# Patient Record
Sex: FEMALE | Race: BLACK OR AFRICAN AMERICAN | NOT HISPANIC OR LATINO | Employment: FULL TIME | URBAN - METROPOLITAN AREA
[De-identification: names, ages, dates, MRNs, and addresses within clinical notes are randomized per-mention and may not be internally consistent; named-entity substitution may affect disease eponyms.]

---

## 2017-03-14 ENCOUNTER — CLINICAL SUPPORT (OUTPATIENT)
Dept: OBSTETRICS AND GYNECOLOGY | Facility: CLINIC | Age: 47
End: 2017-03-14
Payer: COMMERCIAL

## 2017-03-14 DIAGNOSIS — N85.01 SIMPLE ENDOMETRIAL HYPERPLASIA: Primary | ICD-10-CM

## 2017-03-14 PROCEDURE — 96372 THER/PROPH/DIAG INJ SC/IM: CPT | Mod: S$GLB,,, | Performed by: OBSTETRICS & GYNECOLOGY

## 2017-03-14 PROCEDURE — 99999 PR PBB SHADOW E&M-EST. PATIENT-LVL I: CPT | Mod: PBBFAC,,,

## 2017-03-14 RX ADMIN — MEDROXYPROGESTERONE ACETATE 150 MG: 150 INJECTION, SUSPENSION INTRAMUSCULAR at 10:03

## 2017-03-14 NOTE — PROGRESS NOTES
Here for Depo Provera Injection, date due  3/2 - 3/16/17   . Last injection given  12/15/16  . Patient with no current complaints of pain prior to or after injection. Advised to wait 5 minutes. Return between May 30 - June13 /2017 for next injection.  PATIENT SUPPLIED MEDICATION.  See medication Card for RX information  Order verified, inspected package,storage verified,expiration verified      Site - LB

## 2017-06-08 ENCOUNTER — TELEPHONE (OUTPATIENT)
Dept: OBSTETRICS AND GYNECOLOGY | Facility: CLINIC | Age: 47
End: 2017-06-08

## 2017-06-08 DIAGNOSIS — Z12.31 VISIT FOR SCREENING MAMMOGRAM: Primary | ICD-10-CM

## 2017-06-08 NOTE — TELEPHONE ENCOUNTER
----- Message from Acosta Stiles sent at 6/8/2017  3:29 PM CDT -----  Contact: Pt  X_ 1st Request  _ 2nd Request  _ 3rd Request    Who:GAVIN HALEY [[id number]]    Why: Patient would like to have her Mammogram orders put in    What Number to Call Back: [phone]    When to Expect a call back: (Before the end of the day)  -- if call after 3:00 call back will be tomorrow.

## 2017-06-08 NOTE — TELEPHONE ENCOUNTER
----- Message from Acosta Stiles sent at 6/8/2017  3:29 PM CDT -----  Contact: Pt  X_ 1st Request  _ 2nd Request  _ 3rd Request    Who:GAVIN HALEY [[id number]]    Why: Patient would like to have her Mammogram orders put in    What Number to Call Back: [phone]    When to Expect a call back: (Before the end of the day)  -- if call after 3:00 call back will be tomorrow.

## 2017-06-15 ENCOUNTER — CLINICAL SUPPORT (OUTPATIENT)
Dept: OBSTETRICS AND GYNECOLOGY | Facility: CLINIC | Age: 47
End: 2017-06-15
Payer: COMMERCIAL

## 2017-06-15 DIAGNOSIS — N80.9 ENDOMETRIOSIS: Primary | ICD-10-CM

## 2017-06-15 PROCEDURE — 96372 THER/PROPH/DIAG INJ SC/IM: CPT | Mod: S$GLB,,, | Performed by: OBSTETRICS & GYNECOLOGY

## 2017-06-15 RX ADMIN — MEDROXYPROGESTERONE ACETATE 150 MG: 150 INJECTION, SUSPENSION INTRAMUSCULAR at 02:06

## 2017-06-15 NOTE — PROGRESS NOTES
Depo injection given in rt arm, patient supplied medication, patient had no complaints before and after injection. Patient was given date to return.

## 2017-06-28 ENCOUNTER — OFFICE VISIT (OUTPATIENT)
Dept: OBSTETRICS AND GYNECOLOGY | Facility: CLINIC | Age: 47
End: 2017-06-28
Payer: COMMERCIAL

## 2017-06-28 ENCOUNTER — HOSPITAL ENCOUNTER (OUTPATIENT)
Dept: RADIOLOGY | Facility: OTHER | Age: 47
Discharge: HOME | End: 2017-06-28
Attending: OBSTETRICS & GYNECOLOGY
Payer: COMMERCIAL

## 2017-06-28 VITALS
DIASTOLIC BLOOD PRESSURE: 88 MMHG | WEIGHT: 240.31 LBS | HEIGHT: 62 IN | SYSTOLIC BLOOD PRESSURE: 140 MMHG | BODY MASS INDEX: 44.22 KG/M2

## 2017-06-28 DIAGNOSIS — Z01.419 ENCOUNTER FOR GYNECOLOGICAL EXAMINATION WITHOUT ABNORMAL FINDING: Primary | ICD-10-CM

## 2017-06-28 DIAGNOSIS — Z12.31 VISIT FOR SCREENING MAMMOGRAM: ICD-10-CM

## 2017-06-28 DIAGNOSIS — N85.01 SIMPLE ENDOMETRIAL HYPERPLASIA: ICD-10-CM

## 2017-06-28 PROCEDURE — 77063 BREAST TOMOSYNTHESIS BI: CPT | Mod: 26,,, | Performed by: INTERNAL MEDICINE

## 2017-06-28 PROCEDURE — 99396 PREV VISIT EST AGE 40-64: CPT | Mod: S$GLB,,, | Performed by: OBSTETRICS & GYNECOLOGY

## 2017-06-28 PROCEDURE — 99999 PR PBB SHADOW E&M-EST. PATIENT-LVL II: CPT | Mod: PBBFAC,,, | Performed by: OBSTETRICS & GYNECOLOGY

## 2017-06-28 PROCEDURE — 77067 SCR MAMMO BI INCL CAD: CPT | Mod: 26,,, | Performed by: INTERNAL MEDICINE

## 2017-06-28 PROCEDURE — 77067 SCR MAMMO BI INCL CAD: CPT | Mod: TC

## 2017-06-28 RX ORDER — MEDROXYPROGESTERONE ACETATE 150 MG/ML
150 INJECTION, SUSPENSION INTRAMUSCULAR
Qty: 1 SYRINGE | Refills: 3 | Status: SHIPPED | OUTPATIENT
Start: 2017-06-28 | End: 2018-07-18

## 2017-06-28 NOTE — PROGRESS NOTES
PT HERE FOR ANNUAL.  NO PROBLEMS. NEEDS DEPO FOR SIMPLE HYPERPLASIA.    ROS:  GENERAL: No fever, chills, fatigability or weight loss.  VULVAR: No pain, no lesions and no itching.  VAGINAL: No relaxation, no itching, no discharge, no abnormal bleeding and no lesions.  ABDOMEN: No abdominal pain. Denies nausea. Denies vomiting. No diarrhea. No constipation  BREAST: Denies pain. No lumps. No discharge.  URINARY: No incontinence, no nocturia, no frequency and no dysuria.  CARDIOVASCULAR: No chest pain. No shortness of breath. No leg cramps.  NEUROLOGICAL: No headaches. No vision changes.  The remainder of the review of systems was negative.    PE:  General Appearance: obese And Well developed. Well nourished. In no acute distress.  Vulva: Lesions: No.  Urethral Meatus: Normal size. Normal location. No lesions. No prolapse.  Urethra: No masses. No tenderness. No prolapse. No scarring.  Bladder: No masses. No tenderness.  Vagina: Mucosa NI:yes discharge no, atrophy no, cystocele no or rectocele no.  Cervix: Lesion: no  Stenotic: no Cervical motion tenderness: no  Uterus: Uterus size: 7 weeks. Support good. Uterus size: Normal  Adnexa: Masses: No Tenderness: No CDS Nodularity: No  Abdomen: obese No masses. No tenderness.  Breasts: No bilateral masses. No bilateral discharge. No bilateral tenderness. No bilateral fibrocystic changes.  Neck: No thyroid enlargement. No thyroid masses.  Skin: Rashes: No      PROCEDURES:    PLAN:     DIAGNOSIS:  1. Encounter for gynecological examination without abnormal finding    2. Simple endometrial hyperplasia        MEDICATIONS & ORDERS:  Orders Placed This Encounter    medroxyPROGESTERone (DEPO-PROVERA) 150 mg/mL Syrg       Patient was counseled today on the new ACS guidelines for cervical cytology screening as well as the current recommendations for breast cancer screening. She was counseled to follow up with her PCP for other routine health maintenance. Counseling session lasted  approximately 10 minutes, and all her questions were answered.         FOLLOW-UP: With me in 12 month

## 2017-09-13 ENCOUNTER — CLINICAL SUPPORT (OUTPATIENT)
Dept: OBSTETRICS AND GYNECOLOGY | Facility: CLINIC | Age: 47
End: 2017-09-13
Payer: COMMERCIAL

## 2017-09-13 DIAGNOSIS — N85.01 SIMPLE ENDOMETRIAL HYPERPLASIA: Primary | ICD-10-CM

## 2017-09-13 PROCEDURE — 96372 THER/PROPH/DIAG INJ SC/IM: CPT | Mod: S$GLB,,, | Performed by: OBSTETRICS & GYNECOLOGY

## 2017-09-13 PROCEDURE — 99999 PR PBB SHADOW E&M-EST. PATIENT-LVL I: CPT | Mod: PBBFAC,,,

## 2017-09-13 RX ADMIN — MEDROXYPROGESTERONE ACETATE 150 MG: 150 INJECTION, SUSPENSION INTRAMUSCULAR at 04:09

## 2017-09-14 RX ORDER — MEDROXYPROGESTERONE ACETATE 150 MG/ML
150 INJECTION, SUSPENSION INTRAMUSCULAR
Status: DISCONTINUED | OUTPATIENT
Start: 2017-09-14 | End: 2017-09-15

## 2017-09-14 NOTE — PROGRESS NOTES
Here for Depo Provera Injection, date due  8/31 - 9/14/17  . Last injection given  6/15/17  . Patient with no current complaints of pain prior to or after injection. Advised to wait 5 minutes. Return between November 29 - December 14 /2017 for next injection.  PATIENT SUPPLIED MEDICATION.  See medication Card for RX information  Order verified, inspected package,storage verified,expiration verified        Site - RB

## 2017-09-15 ENCOUNTER — HOSPITAL ENCOUNTER (EMERGENCY)
Facility: OTHER | Age: 47
Discharge: HOME | End: 2017-09-15
Attending: EMERGENCY MEDICINE
Payer: COMMERCIAL

## 2017-09-15 VITALS
TEMPERATURE: 98 F | WEIGHT: 238 LBS | RESPIRATION RATE: 16 BRPM | OXYGEN SATURATION: 100 % | HEIGHT: 63 IN | SYSTOLIC BLOOD PRESSURE: 176 MMHG | HEART RATE: 85 BPM | BODY MASS INDEX: 42.17 KG/M2 | DIASTOLIC BLOOD PRESSURE: 94 MMHG

## 2017-09-15 LAB
B-HCG UR QL: NEGATIVE
CTP QC/QA: YES

## 2017-09-15 PROCEDURE — 99283 EMERGENCY DEPT VISIT LOW MDM: CPT | Mod: 25

## 2017-09-15 PROCEDURE — 81025 URINE PREGNANCY TEST: CPT | Performed by: EMERGENCY MEDICINE

## 2017-09-15 PROCEDURE — 96372 THER/PROPH/DIAG INJ SC/IM: CPT

## 2017-09-15 PROCEDURE — 63600175 PHARM REV CODE 636 W HCPCS: Performed by: EMERGENCY MEDICINE

## 2017-09-15 RX ORDER — LOSARTAN POTASSIUM 25 MG/1
25 TABLET ORAL DAILY
COMMUNITY
End: 2019-09-23 | Stop reason: SDUPTHER

## 2017-09-15 RX ORDER — CETIRIZINE HYDROCHLORIDE 5 MG/1
5 TABLET ORAL DAILY
COMMUNITY
End: 2017-12-28

## 2017-09-15 RX ORDER — HYDROCODONE BITARTRATE AND ACETAMINOPHEN 5; 325 MG/1; MG/1
1-2 TABLET ORAL EVERY 6 HOURS PRN
Qty: 15 TABLET | Refills: 0 | Status: SHIPPED | OUTPATIENT
Start: 2017-09-15 | End: 2017-09-25

## 2017-09-15 RX ORDER — KETOROLAC TROMETHAMINE 30 MG/ML
30 INJECTION, SOLUTION INTRAMUSCULAR; INTRAVENOUS
Status: COMPLETED | OUTPATIENT
Start: 2017-09-15 | End: 2017-09-15

## 2017-09-15 RX ORDER — INDOMETHACIN 50 MG/1
50 CAPSULE ORAL 3 TIMES DAILY PRN
Qty: 30 CAPSULE | Refills: 0 | Status: SHIPPED | OUTPATIENT
Start: 2017-09-15 | End: 2019-09-23

## 2017-09-15 RX ORDER — BETAMETHASONE SODIUM PHOSPHATE AND BETAMETHASONE ACETATE 3; 3 MG/ML; MG/ML
6 INJECTION, SUSPENSION INTRA-ARTICULAR; INTRALESIONAL; INTRAMUSCULAR; SOFT TISSUE ONCE
Status: COMPLETED | OUTPATIENT
Start: 2017-09-15 | End: 2017-09-15

## 2017-09-15 RX ORDER — FERROUS SULFATE 325(65) MG
325 TABLET, DELAYED RELEASE (ENTERIC COATED) ORAL
COMMUNITY

## 2017-09-15 RX ADMIN — BETAMETHASONE SODIUM PHOSPHATE AND BETAMETHASONE ACETATE 6 MG: 3; 3 INJECTION, SUSPENSION INTRA-ARTICULAR; INTRALESIONAL; INTRAMUSCULAR at 05:09

## 2017-09-15 RX ADMIN — KETOROLAC TROMETHAMINE 30 MG: 30 INJECTION, SOLUTION INTRAMUSCULAR at 05:09

## 2017-09-15 NOTE — ED TRIAGE NOTES
"C/o left ankle pain x 4 days. Denies any trauma/fall. States "I don't know what happened to it." Ambulatory with difficulty due to pain.   "

## 2017-09-15 NOTE — ED PROVIDER NOTES
"Encounter Date: 9/15/2017    SCRIBE #1 NOTE: I, Olga Lidia Jara, am scribing for, and in the presence of, Dr. Hudson.       History     Chief Complaint   Patient presents with    Ankle Pain     left ankle pain x 4 days; denies any recent trauma or falls; admits similar episodes of pain to ankle; no discoloration or deformity noted       Time seen by provider: 4:36 PM on 09/15/2017    Malia Khanna is a 47 y.o. female with HTN and anemia who presents to the ED with a sudden onset of persistent left ankle pain for the last 3 days with associated swelling. The pain is exacerbated with ROM of the ankle and with weight bearing. 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 She has taken Ibuprofen with no relief. The patient also endorses sciatica pain and numbness that are chronic and unchanged. She is an occasional alcohol drinker. Her PCP is [facility]. The patient denies being pregnant, injury/trauma, history of DM or kidney complications, or any other symptoms at this time. No pertinent SHx noted. Tramadol drug allergy noted.       The history is provided by the patient.     Review of patient's allergies indicates:   Allergen Reactions    Tramadol Nausea And Vomiting and Nausea Only     Past Medical History:   Diagnosis Date    Anemia     Hypertension     Seasonal allergies      Past Surgical History:   Procedure Laterality Date     SECTION      GALLBLADDER SURGERY       Family History   Problem Relation Age of Onset    Colon cancer Father     Cancer Sister     Breast cancer Paternal Aunt      Social History   Substance Use Topics    Smoking status: Never Smoker    Smokeless tobacco: Never Used    Alcohol use Yes      Comment: socially     Review of Systems   Constitutional: Negative for chills and fever.   HENT: Negative for nosebleeds.    Eyes: Negative for visual " disturbance.   Respiratory: Negative for cough and shortness of breath.    Cardiovascular: Negative for chest pain and palpitations.   Gastrointestinal: Negative for abdominal pain, diarrhea, nausea and vomiting.   Genitourinary: Negative for dysuria and hematuria.   Musculoskeletal: Positive for arthralgias (left ankle) and joint swelling (left ankle).   Skin: Negative for rash.   Neurological: Negative for seizures, syncope and headaches.     Physical Exam     Initial Vitals [09/15/17 1500]   BP Pulse Resp Temp SpO2   (!) 172/99 (!) 111 16 98.2 °F (36.8 °C) 100 %      MAP       123.33         Physical Exam    Nursing note and vitals reviewed.  Constitutional: She appears well-developed and well-nourished. She appears distressed.   Mild distress. Overweight.    HENT:   Head: Normocephalic and atraumatic.   Musculoskeletal:        Left ankle: She exhibits normal range of motion.   Soft tissue swelling of the left lateral ankle. Warmth to ankle without erythema. No ecchymosis or skin lesions. Pain but able to perform full ROM. No focal bony tenderness. Rest of extremities normal.    Neurological: She is alert and oriented to person, place, and time.   Skin: Skin is warm. No ecchymosis and no lesion noted. No erythema.       ED Course   Procedures  Labs Reviewed   POCT URINE PREGNANCY           Medical Decision Making:   History:   Old Medical Records: I decided to obtain old medical records.            Scribe Attestation:   Scribe #1: I performed the above scribed service and the documentation accurately describes the services I performed. I attest to the accuracy of the note.    Attending Attestation:           Physician Attestation for Scribe:  Physician Attestation Statement for Scribe #1: I, Dr. Hudson, reviewed documentation, as scribed by Olga Lidia Jara in my presence, and it is both accurate and complete.                 ED Course      Patient presents with recurrence of pain and swelling of the left ankle.   She has had 4-5 episodes over the past 18 months.  Previously had x-rays at another facility.  No new trauma.  I do not see benefit in repeating the x-ray especially in the absence of focal bony tenderness her symptoms and exam suggestive of gouty arthritis.  We'll treat her with a dose of Celestone, start indomethacin as well as when necessary Vicodin as she appears significantly uncomfortable encouraged her to follow up with her primary care clinic which may signal for routine lab testing as well as uric acid RF, etc. As they feel necessary.      Clinical Impression:     1. Gouty arthritis of lower leg          Disposition:   Disposition: Discharged  Condition: Stable                        Chriss Hudson II, MD  09/15/17 9090

## 2017-12-13 ENCOUNTER — CLINICAL SUPPORT (OUTPATIENT)
Dept: OBSTETRICS AND GYNECOLOGY | Facility: CLINIC | Age: 47
End: 2017-12-13
Payer: COMMERCIAL

## 2017-12-13 DIAGNOSIS — Z30.42 ON DEPO-PROVERA FOR CONTRACEPTION: Primary | ICD-10-CM

## 2017-12-13 PROCEDURE — 96372 THER/PROPH/DIAG INJ SC/IM: CPT | Mod: S$GLB,,, | Performed by: OBSTETRICS & GYNECOLOGY

## 2017-12-13 PROCEDURE — 99999 PR PBB SHADOW E&M-EST. PATIENT-LVL II: CPT | Mod: PBBFAC,,,

## 2017-12-13 RX ADMIN — MEDROXYPROGESTERONE ACETATE 150 MG: 150 INJECTION, SUSPENSION INTRAMUSCULAR at 03:12

## 2017-12-18 RX ORDER — MEDROXYPROGESTERONE ACETATE 150 MG/ML
150 INJECTION, SUSPENSION INTRAMUSCULAR
Status: DISCONTINUED | OUTPATIENT
Start: 2017-12-18 | End: 2018-07-18

## 2017-12-18 NOTE — PROGRESS NOTES
Here for Depo Provera Injection, date due 11/29 - 12/14/17 . Last injection given  9/13/17 . Patient with no current complaints of pain prior to or after injection. Advised to wait 5 minutes. Return between  Feb 28 - March 14 /2018 for next injection.  PATIENT SUPPLIED MEDICATION.  See medication Card for RX information  Order verified, inspected package,storage verified,expiration verified        Site -  RB

## 2017-12-28 ENCOUNTER — HOSPITAL ENCOUNTER (EMERGENCY)
Facility: OTHER | Age: 47
Discharge: HOME | End: 2017-12-28
Attending: EMERGENCY MEDICINE
Payer: COMMERCIAL

## 2017-12-28 VITALS
SYSTOLIC BLOOD PRESSURE: 177 MMHG | DIASTOLIC BLOOD PRESSURE: 93 MMHG | OXYGEN SATURATION: 98 % | BODY MASS INDEX: 45.27 KG/M2 | HEART RATE: 97 BPM | TEMPERATURE: 98 F | WEIGHT: 246 LBS | RESPIRATION RATE: 17 BRPM | HEIGHT: 62 IN

## 2017-12-28 DIAGNOSIS — M25.462 EFFUSION OF LEFT KNEE: ICD-10-CM

## 2017-12-28 DIAGNOSIS — M25.562 LEFT KNEE PAIN: ICD-10-CM

## 2017-12-28 DIAGNOSIS — M17.12 PRIMARY OSTEOARTHRITIS OF LEFT KNEE: Primary | ICD-10-CM

## 2017-12-28 PROCEDURE — 63600175 PHARM REV CODE 636 W HCPCS: Performed by: EMERGENCY MEDICINE

## 2017-12-28 PROCEDURE — 99283 EMERGENCY DEPT VISIT LOW MDM: CPT | Mod: 25

## 2017-12-28 PROCEDURE — 25000003 PHARM REV CODE 250: Performed by: EMERGENCY MEDICINE

## 2017-12-28 PROCEDURE — 96372 THER/PROPH/DIAG INJ SC/IM: CPT

## 2017-12-28 RX ORDER — ONDANSETRON 8 MG/1
8 TABLET, ORALLY DISINTEGRATING ORAL
Status: COMPLETED | OUTPATIENT
Start: 2017-12-28 | End: 2017-12-28

## 2017-12-28 RX ORDER — BETAMETHASONE SODIUM PHOSPHATE AND BETAMETHASONE ACETATE 3; 3 MG/ML; MG/ML
6 INJECTION, SUSPENSION INTRA-ARTICULAR; INTRALESIONAL; INTRAMUSCULAR; SOFT TISSUE ONCE
Status: COMPLETED | OUTPATIENT
Start: 2017-12-28 | End: 2017-12-28

## 2017-12-28 RX ORDER — ACETAMINOPHEN AND CODEINE PHOSPHATE 300; 30 MG/1; MG/1
1 TABLET ORAL
Status: COMPLETED | OUTPATIENT
Start: 2017-12-28 | End: 2017-12-28

## 2017-12-28 RX ADMIN — BETAMETHASONE SODIUM PHOSPHATE AND BETAMETHASONE ACETATE 6 MG: 3; 3 INJECTION, SUSPENSION INTRA-ARTICULAR; INTRALESIONAL; INTRAMUSCULAR at 10:12

## 2017-12-28 RX ADMIN — ACETAMINOPHEN AND CODEINE PHOSPHATE 1 TABLET: 300; 30 TABLET ORAL at 10:12

## 2017-12-28 RX ADMIN — ONDANSETRON 8 MG: 8 TABLET, ORALLY DISINTEGRATING ORAL at 10:12

## 2017-12-29 NOTE — ED TRIAGE NOTES
"Patient presents to ED with c/o left knee pain, states "it's happened two times in a month". Patient denies fall or trauma, reports increased pain with movement. Pt AAO x4.   "

## 2017-12-29 NOTE — ED PROVIDER NOTES
Encounter Date: 2017    SCRIBE #1 NOTE: I, Nigel Badillo, am scribing for, and in the presence of,  Dr. Adhikari. I have scribed the entire note.       History     Chief Complaint   Patient presents with    Knee Pain     c/o non traumatic left knee pain, hx Gout, pain started yesterday      Time patient was seen by the provider: 9:39 PM      The patient is a 47 y.o. female with hx of: HTN, anemia, who presents to the ED with a complaint of left knee pain. She reports it has been hurting since yesterday. She localizes the pain to her knee, shooting up and down her whole leg but originating in the knee. The patient denies associated symptoms including fevers, chills, or nausea/vomtiing. The patient denies any trauma, over use, or any twisting or giving of the knee in the recent past. She has had 3 other episodes with rapid onset of pain that is severe enough not to be able to ambulate. She was seen for possible gout, and given medications to help her with her pain. She has done well until now on Indomethacin and colchicene. She was taking 1 pill per day but will go up to 3 with a flare of pain. She has only taken 1 today, early this morning.          Review of patient's allergies indicates:   Allergen Reactions    Tramadol Nausea And Vomiting and Nausea Only     Past Medical History:   Diagnosis Date    Anemia     Hypertension     Seasonal allergies      Past Surgical History:   Procedure Laterality Date     SECTION      GALLBLADDER SURGERY       Family History   Problem Relation Age of Onset    Colon cancer Father     Cancer Sister     Breast cancer Paternal Aunt      Social History   Substance Use Topics    Smoking status: Never Smoker    Smokeless tobacco: Never Used    Alcohol use Yes      Comment: socially     Review of Systems   Constitutional: Negative for fever.   HENT: Negative for sore throat.    Respiratory: Negative for shortness of breath.    Cardiovascular: Negative for chest pain  and leg swelling.   Gastrointestinal: Negative for nausea and vomiting.   Genitourinary: Negative for dysuria.   Musculoskeletal: Positive for arthralgias and joint swelling. Negative for back pain.   Skin: Negative for rash.   Neurological: Negative for weakness.   Hematological: Does not bruise/bleed easily.       Physical Exam     Initial Vitals [12/28/17 2114]   BP Pulse Resp Temp SpO2   (!) 195/93 110 20 98.4 °F (36.9 °C) 99 %      MAP       127         Physical Exam    Nursing note and vitals reviewed.  Constitutional: She appears well-developed.   HENT:   Head: Normocephalic and atraumatic.   Mouth/Throat: Oropharynx is clear and moist.   Eyes: Conjunctivae are normal.   Neck: Neck supple.   Cardiovascular: Normal rate, regular rhythm, normal heart sounds and intact distal pulses. Exam reveals no gallop and no friction rub.    No murmur heard.  Pulmonary/Chest: Breath sounds normal. She has no wheezes. She has no rhonchi. She has no rales.   Abdominal: Soft. She exhibits no distension. There is no tenderness.   Musculoskeletal: Normal range of motion.   Tenderness over medial aspect knee, super patellar effusion, tenderness inferior aspect knee, no erythema or warmth, ROM limited secondary to pain, no palpable masses or cords but the calf is tender, 2+ DP pulses, dorsi and plantar flexion is limited secondary to pain.   Neurological: She is alert and oriented to person, place, and time.   Skin: No rash noted. No erythema.   Psychiatric: She has a normal mood and affect.         ED Course   Procedures  Labs Reviewed - No data to display       X-Rays:   Independently Interpreted Readings:   Other Readings:  X-ray Right knee    Impression - large infrapatellar effusion, but no sign of acute fracture or dislocation       Additional MDM:   Comments: 47-year-old female presented with complaint of atraumatic left knee pain that has been present since yesterday.  On exam she had limited range of motion of the knee  with tenderness to palpation primarily along the inferior and medial aspect of the knee.  There were no overlying skin changes.  I do not suspect a septic joint given her presentation.  I also do not suspect inflammatory arthritis of the cause of her pain.  X-ray showed an effusion as well as osteoarthritis.  The patient was given a dose of less than which she states relieved her pain previously.  She was not discharged home with any additional prescriptions as she still has indomethacin.  She discharged home with an Ace wrap and  information to follow-up with orthopedics for further evaluation..          Scribe Attestation:   Scribe #1: I performed the above scribed service and the documentation accurately describes the services I performed. I attest to the accuracy of the note.    Attending Attestation:           Physician Attestation for Scribe:  Physician Attestation Statement for Scribe #1: I, Dr. Adhikari, reviewed documentation, as scribed by Nigel Badillo in my presence, and it is both accurate and complete.                 ED Course      Clinical Impression:   The primary encounter diagnosis was Primary osteoarthritis of left knee. Diagnoses of Left knee pain and Effusion of left knee were also pertinent to this visit.    Disposition:   Disposition: Discharged  Condition: Stable                        Nohemy Adhikari MD  12/28/17 0477

## 2018-03-09 ENCOUNTER — CLINICAL SUPPORT (OUTPATIENT)
Dept: OBSTETRICS AND GYNECOLOGY | Facility: CLINIC | Age: 48
End: 2018-03-09
Payer: COMMERCIAL

## 2018-03-09 PROCEDURE — 99999 PR PBB SHADOW E&M-EST. PATIENT-LVL II: CPT | Mod: PBBFAC,,,

## 2018-03-09 PROCEDURE — 96372 THER/PROPH/DIAG INJ SC/IM: CPT | Mod: S$GLB,,, | Performed by: OBSTETRICS & GYNECOLOGY

## 2018-03-09 RX ADMIN — MEDROXYPROGESTERONE ACETATE 150 MG: 150 INJECTION, SUSPENSION INTRAMUSCULAR at 02:03

## 2018-03-09 NOTE — PROGRESS NOTES
Here for Depo Provera Injection, date due  2/28 - 3/14/18 . Last injection given  12/13/17 . Patient with no current complaints of pain prior to or after injection. Advised to wait 5 minutes. Return between   May 25 - June 8 /2018 for next injection.  PATIENT SUPPLIED MEDICATION.  See medication Card for RX information  Order verified, inspected package,storage verified,expiration verified        Site - RB

## 2018-06-14 ENCOUNTER — CLINICAL SUPPORT (OUTPATIENT)
Dept: OBSTETRICS AND GYNECOLOGY | Facility: CLINIC | Age: 48
End: 2018-06-14
Payer: COMMERCIAL

## 2018-06-14 DIAGNOSIS — Z30.9 ENCOUNTER FOR CONTRACEPTIVE MANAGEMENT, UNSPECIFIED TYPE: Primary | ICD-10-CM

## 2018-06-14 LAB
B-HCG UR QL: NEGATIVE
CTP QC/QA: YES

## 2018-06-14 PROCEDURE — 81025 URINE PREGNANCY TEST: CPT | Mod: S$GLB,,, | Performed by: OBSTETRICS & GYNECOLOGY

## 2018-06-14 PROCEDURE — 99999 PR PBB SHADOW E&M-EST. PATIENT-LVL I: CPT | Mod: PBBFAC,,,

## 2018-06-14 PROCEDURE — 96372 THER/PROPH/DIAG INJ SC/IM: CPT | Mod: S$GLB,,, | Performed by: OBSTETRICS & GYNECOLOGY

## 2018-06-14 RX ADMIN — MEDROXYPROGESTERONE ACETATE 150 MG: 150 INJECTION, SUSPENSION INTRAMUSCULAR at 03:06

## 2018-06-14 NOTE — PROGRESS NOTES
Admin 150mg of Depo Provera into RUE. Pt reports no pain at this time. Pt instructed to remain in clinic for 15 mins following the injection. No adverse reaction noted.    Pt Supplied medication

## 2018-06-28 DIAGNOSIS — Z12.31 ENCOUNTER FOR SCREENING MAMMOGRAM FOR BREAST CANCER: Primary | ICD-10-CM

## 2018-07-18 ENCOUNTER — OFFICE VISIT (OUTPATIENT)
Dept: OBSTETRICS AND GYNECOLOGY | Facility: CLINIC | Age: 48
End: 2018-07-18
Payer: COMMERCIAL

## 2018-07-18 ENCOUNTER — HOSPITAL ENCOUNTER (OUTPATIENT)
Dept: RADIOLOGY | Facility: OTHER | Age: 48
Discharge: HOME | End: 2018-07-18
Attending: OBSTETRICS & GYNECOLOGY
Payer: COMMERCIAL

## 2018-07-18 VITALS
SYSTOLIC BLOOD PRESSURE: 130 MMHG | HEIGHT: 62 IN | DIASTOLIC BLOOD PRESSURE: 84 MMHG | WEIGHT: 246.06 LBS | BODY MASS INDEX: 45.28 KG/M2

## 2018-07-18 VITALS — HEIGHT: 62 IN | BODY MASS INDEX: 45.27 KG/M2 | WEIGHT: 246 LBS

## 2018-07-18 DIAGNOSIS — Z30.9 ENCOUNTER FOR CONTRACEPTIVE MANAGEMENT, UNSPECIFIED TYPE: ICD-10-CM

## 2018-07-18 DIAGNOSIS — N85.01 SIMPLE ENDOMETRIAL HYPERPLASIA: ICD-10-CM

## 2018-07-18 DIAGNOSIS — Z01.419 ENCOUNTER FOR GYNECOLOGICAL EXAMINATION WITHOUT ABNORMAL FINDING: Primary | ICD-10-CM

## 2018-07-18 DIAGNOSIS — Z12.31 ENCOUNTER FOR SCREENING MAMMOGRAM FOR BREAST CANCER: ICD-10-CM

## 2018-07-18 PROCEDURE — 99999 PR PBB SHADOW E&M-EST. PATIENT-LVL III: CPT | Mod: PBBFAC,,, | Performed by: OBSTETRICS & GYNECOLOGY

## 2018-07-18 PROCEDURE — 77063 BREAST TOMOSYNTHESIS BI: CPT | Mod: TC

## 2018-07-18 PROCEDURE — 77067 SCR MAMMO BI INCL CAD: CPT | Mod: 26,,, | Performed by: RADIOLOGY

## 2018-07-18 PROCEDURE — 77063 BREAST TOMOSYNTHESIS BI: CPT | Mod: 26,,, | Performed by: RADIOLOGY

## 2018-07-18 PROCEDURE — 99396 PREV VISIT EST AGE 40-64: CPT | Mod: S$GLB,,, | Performed by: OBSTETRICS & GYNECOLOGY

## 2018-07-18 RX ORDER — AMLODIPINE BESYLATE 10 MG/1
TABLET ORAL
COMMUNITY
Start: 2018-05-31

## 2018-07-18 RX ORDER — MEDROXYPROGESTERONE ACETATE 150 MG/ML
150 INJECTION, SUSPENSION INTRAMUSCULAR
Qty: 1 SYRINGE | Refills: 3 | Status: SHIPPED | OUTPATIENT
Start: 2018-07-18 | End: 2019-09-02 | Stop reason: SDUPTHER

## 2018-07-18 RX ORDER — NAPROXEN 500 MG/1
TABLET ORAL
COMMUNITY
Start: 2018-05-31 | End: 2019-09-23

## 2018-07-18 NOTE — PROGRESS NOTES
07/23/18 U/S  Enlarged uterus measuring approximately 11.4 x 7.6 x 7.5 cm noted.  There are at least 2 uterine fibroids measuring 1.6 x 0.8 x 1.5 cm and a 2nd uterine fibroid measuring 3.1 x 2.7 x 2.8 cm in the fundus of the uterus and along the posterior body of the uterus lateralize to the left.    The endometrial stripe measures approximately 0.5 cm.    The right ovary measures 3.2 x 1.8 x 2.1 cm with normal vascular flow.  No associated cystic or solid masses.    The left ovary measures 3.4 x 2.1 x 2.9 cm.      Impression       1. Enlarged uterus with at least 2 uterine fibroids.  2. Rest of the examination is unremarkable.            7/18/2018    FSH 8.10       PT HERE FOR ANNUAL.  ON DEPO FOR HYPERPLASIA. HAS BROWN SPOTTING WHEN ITS TIME FOR DEPO.   SX AT NIGHT OCC.  REFUSES EMBX    ROS:  GENERAL: No fever, chills, fatigability or weight loss.  VULVAR: No pain, no lesions and no itching.  VAGINAL: No relaxation, no itching, no discharge, no abnormal bleeding and no lesions.  ABDOMEN: No abdominal pain. Denies nausea. Denies vomiting. No diarrhea. No constipation  BREAST: Denies pain. No lumps. No discharge.  URINARY: No incontinence, no nocturia, no frequency and no dysuria.  CARDIOVASCULAR: No chest pain. No shortness of breath. No leg cramps.  NEUROLOGICAL: No headaches. No vision changes.  The remainder of the review of systems was negative.    PE:  General Appearance: obese And Well developed. Well nourished. In no acute distress.  Vulva: Lesions: No.  Urethral Meatus: Normal size. Normal location. No lesions. No prolapse.  Urethra: No masses. No tenderness. No prolapse. No scarring.  Bladder: No masses. No tenderness.  Vagina: Mucosa NI:yes discharge no, atrophy no, cystocele no or rectocele no.  Cervix: Lesion: no  Stenotic: no Cervical motion tenderness: no  Uterus: Uterus size: 6 weeks. Support good. Uterus size: Normal  Adnexa: Masses: No Tenderness: No CDS Nodularity: No  Abdomen: obese No masses.  No tenderness.  Breasts: No bilateral masses. No bilateral discharge. No bilateral tenderness. No bilateral fibrocystic changes.  Neck: No thyroid enlargement. No thyroid masses.  Skin: Rashes: No      PROCEDURES:    PLAN:     DIAGNOSIS:  1. Encounter for gynecological examination without abnormal finding    2. Simple endometrial hyperplasia    3. Encounter for contraceptive management, unspecified type        MEDICATIONS & ORDERS:  Orders Placed This Encounter    US Pelvis Comp with Transvag NON-OB (xpd    Follicle stimulating hormone    medroxyPROGESTERone (DEPO-PROVERA) 150 mg/mL Syrg       Patient was counseled today on the new ACS guidelines for cervical cytology screening as well as the current recommendations for breast cancer screening. She was counseled to follow up with her PCP for other routine health maintenance. Counseling session lasted approximately 10 minutes, and all her questions were answered.     10 MIN D/W PT ON HYPERPLASIA    FOLLOW-UP: With me AFTER U/S

## 2018-07-23 ENCOUNTER — TELEPHONE (OUTPATIENT)
Dept: OBSTETRICS AND GYNECOLOGY | Facility: CLINIC | Age: 48
End: 2018-07-23

## 2018-07-23 NOTE — TELEPHONE ENCOUNTER
Called patient to discuss negative test results . Patient can continue to take Depo.  called in patient Rx.

## 2018-07-23 NOTE — TELEPHONE ENCOUNTER
Spoke with pt and advised her of results per Dr Ball. Pt wanted to know if she still needed the u/s. Informed pt I would ask Dr Ball and give her a call back tomorrow. Pt verbalized understanding.

## 2018-07-23 NOTE — TELEPHONE ENCOUNTER
----- Message from Tonio Ball Jr., MD sent at 7/20/2018  3:42 PM CDT -----  CALL. NOT MENOPAUSE. CON'T DEPO

## 2018-07-23 NOTE — TELEPHONE ENCOUNTER
----- Message from Seble Schumacher sent at 7/23/2018  3:57 PM CDT -----  Contact: GAVIN HALEY [[id number]]            Name of Who is Calling:GAVIN HALEY [[id number]]    What is the request in detail: Returning a call.    Can the clinic reply by [facility]:     [facility] [phone] (home)

## 2018-07-25 ENCOUNTER — HOSPITAL ENCOUNTER (OUTPATIENT)
Dept: RADIOLOGY | Facility: OTHER | Age: 48
Discharge: HOME | End: 2018-07-25
Attending: OBSTETRICS & GYNECOLOGY
Payer: COMMERCIAL

## 2018-07-25 DIAGNOSIS — N85.01 SIMPLE ENDOMETRIAL HYPERPLASIA: ICD-10-CM

## 2018-07-25 PROCEDURE — 76830 TRANSVAGINAL US NON-OB: CPT | Mod: TC

## 2018-07-25 PROCEDURE — 76830 TRANSVAGINAL US NON-OB: CPT | Mod: 26,,, | Performed by: RADIOLOGY

## 2018-07-25 PROCEDURE — 76856 US EXAM PELVIC COMPLETE: CPT | Mod: 26,,, | Performed by: RADIOLOGY

## 2018-08-01 ENCOUNTER — TELEPHONE (OUTPATIENT)
Dept: OBSTETRICS AND GYNECOLOGY | Facility: CLINIC | Age: 48
End: 2018-08-01

## 2018-08-01 NOTE — TELEPHONE ENCOUNTER
----- Message from Tonio Ball Jr., MD sent at 7/31/2018  6:11 PM CDT -----  Results normal. Please call patient

## 2018-09-07 ENCOUNTER — CLINICAL SUPPORT (OUTPATIENT)
Dept: OBSTETRICS AND GYNECOLOGY | Facility: CLINIC | Age: 48
End: 2018-09-07
Payer: COMMERCIAL

## 2018-09-07 DIAGNOSIS — N85.01 SIMPLE ENDOMETRIAL HYPERPLASIA: Primary | ICD-10-CM

## 2018-09-07 PROCEDURE — 99999 PR PBB SHADOW E&M-EST. PATIENT-LVL II: CPT | Mod: PBBFAC,,,

## 2018-09-07 PROCEDURE — 96372 THER/PROPH/DIAG INJ SC/IM: CPT | Mod: S$GLB,,, | Performed by: OBSTETRICS & GYNECOLOGY

## 2018-09-07 RX ORDER — MEDROXYPROGESTERONE ACETATE 150 MG/ML
150 INJECTION, SUSPENSION INTRAMUSCULAR
Status: DISCONTINUED | OUTPATIENT
Start: 2018-09-07 | End: 2019-09-03

## 2018-09-07 RX ADMIN — MEDROXYPROGESTERONE ACETATE 150 MG: 150 INJECTION, SUSPENSION INTRAMUSCULAR at 10:09

## 2018-09-07 NOTE — PROGRESS NOTES
Here for Depo Provera Injection, date due  8/30 - 9/13/18 . Last injection given  6/14/18 . Patient with no current complaints of pain prior to or after injection. Advised to wait 5 minutes. Return between  November 23 - December 7 /2018 for next injection.  PATIENT SUPPLIED MEDICATION.  See medication Card for RX information  Order verified, inspected package,storage verified,expiration verified        Site -  LD

## 2018-12-12 ENCOUNTER — CLINICAL SUPPORT (OUTPATIENT)
Dept: OBSTETRICS AND GYNECOLOGY | Facility: CLINIC | Age: 48
End: 2018-12-12
Payer: COMMERCIAL

## 2018-12-12 DIAGNOSIS — N85.01 SIMPLE ENDOMETRIAL HYPERPLASIA: Primary | ICD-10-CM

## 2018-12-12 LAB
B-HCG UR QL: NEGATIVE
CTP QC/QA: YES

## 2018-12-12 PROCEDURE — 81025 URINE PREGNANCY TEST: CPT | Mod: S$GLB,,, | Performed by: OBSTETRICS & GYNECOLOGY

## 2018-12-12 PROCEDURE — 96372 THER/PROPH/DIAG INJ SC/IM: CPT | Mod: S$GLB,,, | Performed by: OBSTETRICS & GYNECOLOGY

## 2018-12-12 PROCEDURE — 99999 PR PBB SHADOW E&M-EST. PATIENT-LVL I: CPT | Mod: PBBFAC,,,

## 2018-12-12 RX ADMIN — MEDROXYPROGESTERONE ACETATE 150 MG: 150 INJECTION, SUSPENSION INTRAMUSCULAR at 02:12

## 2018-12-12 NOTE — PROGRESS NOTES
Pregnancy test needed to be performed, arrived out of timeframe. Here for Depo Provera Injection,  UPT obtained with Negative  results . Patient with no current complaints of pain prior to or after injection. Advised to wait 5 minutes. Return between  Feb 27 - March 13 /2019 for next injection.  PATIENT SUPPLIED MEDICATION.  See medication Card for RX information  Order verified, inspected package,storage verified,expiration verified      APPOINTMENT MADE FOR NEXT INJECTION    Site - RD

## 2019-03-12 ENCOUNTER — CLINICAL SUPPORT (OUTPATIENT)
Dept: OBSTETRICS AND GYNECOLOGY | Facility: CLINIC | Age: 49
End: 2019-03-12
Payer: COMMERCIAL

## 2019-03-12 PROCEDURE — 96372 THER/PROPH/DIAG INJ SC/IM: CPT | Mod: S$GLB,,, | Performed by: OBSTETRICS & GYNECOLOGY

## 2019-03-12 PROCEDURE — 99999 PR PBB SHADOW E&M-EST. PATIENT-LVL II: ICD-10-PCS | Mod: PBBFAC,,,

## 2019-03-12 PROCEDURE — 96372 PR INJECTION,THERAP/PROPH/DIAG2ST, IM OR SUBCUT: ICD-10-PCS | Mod: S$GLB,,, | Performed by: OBSTETRICS & GYNECOLOGY

## 2019-03-12 PROCEDURE — 99999 PR PBB SHADOW E&M-EST. PATIENT-LVL II: CPT | Mod: PBBFAC,,,

## 2019-03-12 RX ADMIN — MEDROXYPROGESTERONE ACETATE 150 MG: 150 INJECTION, SUSPENSION INTRAMUSCULAR at 03:03

## 2019-03-19 NOTE — PROGRESS NOTES
Here for Depo Provera Injection, date due  2/27 - 3/13/19 . Last injection given  12/12/18. Patient with no current complaints of pain prior to or after injection. Advised to wait 5 minutes. Return between  May 28- June 11 /2019 for next injection.  PATIENT SUPPLIED MEDICATION.  See medication Card for RX information  Order verified, inspected package,storage verified,expiration verified        Site - LD

## 2019-06-04 ENCOUNTER — CLINICAL SUPPORT (OUTPATIENT)
Dept: OBSTETRICS AND GYNECOLOGY | Facility: CLINIC | Age: 49
End: 2019-06-04
Payer: COMMERCIAL

## 2019-06-04 PROCEDURE — 96372 THER/PROPH/DIAG INJ SC/IM: CPT | Mod: S$GLB,,, | Performed by: OBSTETRICS & GYNECOLOGY

## 2019-06-04 PROCEDURE — 99999 PR PBB SHADOW E&M-EST. PATIENT-LVL I: CPT | Mod: PBBFAC,,,

## 2019-06-04 PROCEDURE — 96372 PR INJECTION,THERAP/PROPH/DIAG2ST, IM OR SUBCUT: ICD-10-PCS | Mod: S$GLB,,, | Performed by: OBSTETRICS & GYNECOLOGY

## 2019-06-04 PROCEDURE — 99999 PR PBB SHADOW E&M-EST. PATIENT-LVL I: ICD-10-PCS | Mod: PBBFAC,,,

## 2019-06-04 RX ADMIN — MEDROXYPROGESTERONE ACETATE 150 MG: 150 INJECTION, SUSPENSION INTRAMUSCULAR at 04:06

## 2019-06-04 NOTE — PROGRESS NOTES
Here for Depo Provera Injection, date due 05/28/2019 - 06/11/2019. Last injection given  03/12/2019. Patient with no current complaints of pain prior to or after injection. Advised to wait 5 minutes. Return between 08/20/2019 - 09/03/2019 for next injection.      PATIENT SUPPLIED MEDICATION.    See medication Card for RX information    Order verified, inspected package,storage verified,expiration verified      Site -

## 2019-07-18 ENCOUNTER — TELEPHONE (OUTPATIENT)
Dept: OBSTETRICS AND GYNECOLOGY | Facility: CLINIC | Age: 49
End: 2019-07-18

## 2019-07-18 DIAGNOSIS — Z12.39 BREAST SCREENING: Primary | ICD-10-CM

## 2019-07-18 NOTE — TELEPHONE ENCOUNTER
----- Message from Kathy Neil sent at 7/18/2019  4:27 PM CDT -----  Contact: Self    Name of Who is Calling: GAVIN HALEY [[id number]]      What is the request in detail: Pt states she needs an order for a mammogram placed in the system. Please contact to further discuss and advise.        Can the clinic reply by [facility]: N      What [facility] Back [facility] [phone]    CM

## 2019-08-15 ENCOUNTER — TELEPHONE (OUTPATIENT)
Dept: OBSTETRICS AND GYNECOLOGY | Facility: CLINIC | Age: 49
End: 2019-08-15

## 2019-08-15 NOTE — TELEPHONE ENCOUNTER
----- Message from Sen Helms sent at 8/15/2019  3:17 PM CDT -----  PT HAS A APPT WITH DR. YOUNG ON TOMORROW SHE WAS COMING IN TO GET A RX FOR HER DEPO INJECTION CAN YOU CALL IT IN TO HER PHARMACY AND SHE WILL SCHEDULE ANOTHER APPT LATER  [phone]

## 2019-08-16 ENCOUNTER — HOSPITAL ENCOUNTER (OUTPATIENT)
Dept: RADIOLOGY | Facility: OTHER | Age: 49
Discharge: HOME | End: 2019-08-16
Attending: OBSTETRICS & GYNECOLOGY
Payer: COMMERCIAL

## 2019-08-16 VITALS — WEIGHT: 246 LBS | HEIGHT: 62 IN | BODY MASS INDEX: 45.27 KG/M2

## 2019-08-16 DIAGNOSIS — Z12.39 BREAST SCREENING: ICD-10-CM

## 2019-08-16 PROCEDURE — 77067 SCR MAMMO BI INCL CAD: CPT | Mod: 26,,, | Performed by: INTERNAL MEDICINE

## 2019-08-16 PROCEDURE — 77063 BREAST TOMOSYNTHESIS BI: CPT | Mod: 26,,, | Performed by: INTERNAL MEDICINE

## 2019-08-16 PROCEDURE — 77067 SCR MAMMO BI INCL CAD: CPT | Mod: TC

## 2019-08-16 PROCEDURE — 77063 MAMMO DIGITAL SCREENING BILAT WITH TOMOSYNTHESIS_CAD: ICD-10-PCS | Mod: 26,,, | Performed by: INTERNAL MEDICINE

## 2019-08-16 PROCEDURE — 77067 MAMMO DIGITAL SCREENING BILAT WITH TOMOSYNTHESIS_CAD: ICD-10-PCS | Mod: 26,,, | Performed by: INTERNAL MEDICINE

## 2019-09-03 RX ORDER — MEDROXYPROGESTERONE ACETATE 150 MG/ML
INJECTION, SUSPENSION INTRAMUSCULAR
Qty: 1 ML | Refills: 0 | Status: SHIPPED | OUTPATIENT
Start: 2019-09-03 | End: 2019-09-23 | Stop reason: SDUPTHER

## 2019-09-03 NOTE — TELEPHONE ENCOUNTER
----- Message from Sen Helms sent at 9/3/2019  1:17 PM CDT -----  Please call pt regarding her depo injection she is schedule for tomorrow and it has not been call in to her pharmacy [phone]

## 2019-09-03 NOTE — TELEPHONE ENCOUNTER
----- Message from Cecile Coelho sent at 9/3/2019  8:06 AM CDT -----  Contact: GAVIN HALEY [[id number]]  Please refill the medication(s) listed below :    Medication # 1 : medroxyPROGESTERone (DEPO-PROVERA) 150 mg/mL Syrg    Medication # 2 :    Please call the patient when the prescription(s) is ready for  : [phone]    Can the clinic reply in [facility] : Phone call only    Preferred Pharmacy : [id number] [address] [facility]

## 2019-09-04 ENCOUNTER — CLINICAL SUPPORT (OUTPATIENT)
Dept: OBSTETRICS AND GYNECOLOGY | Facility: CLINIC | Age: 49
End: 2019-09-04
Payer: COMMERCIAL

## 2019-09-04 DIAGNOSIS — Z30.42 ON DEPO-PROVERA FOR CONTRACEPTION: Primary | ICD-10-CM

## 2019-09-04 LAB
B-HCG UR QL: NEGATIVE
CTP QC/QA: YES

## 2019-09-04 PROCEDURE — 81025 URINE PREGNANCY TEST: CPT | Mod: S$GLB,,, | Performed by: OBSTETRICS & GYNECOLOGY

## 2019-09-04 PROCEDURE — 99999 PR PBB SHADOW E&M-EST. PATIENT-LVL I: CPT | Mod: PBBFAC,,,

## 2019-09-04 PROCEDURE — 99999 PR PBB SHADOW E&M-EST. PATIENT-LVL I: ICD-10-PCS | Mod: PBBFAC,,,

## 2019-09-04 PROCEDURE — 81025 POCT URINE PREGNANCY: ICD-10-PCS | Mod: S$GLB,,, | Performed by: OBSTETRICS & GYNECOLOGY

## 2019-09-04 PROCEDURE — 96372 PR INJECTION,THERAP/PROPH/DIAG2ST, IM OR SUBCUT: ICD-10-PCS | Mod: S$GLB,,, | Performed by: OBSTETRICS & GYNECOLOGY

## 2019-09-04 PROCEDURE — 96372 THER/PROPH/DIAG INJ SC/IM: CPT | Mod: S$GLB,,, | Performed by: OBSTETRICS & GYNECOLOGY

## 2019-09-04 RX ORDER — MEDROXYPROGESTERONE ACETATE 150 MG/ML
150 INJECTION, SUSPENSION INTRAMUSCULAR
Status: DISCONTINUED | OUTPATIENT
Start: 2019-09-04 | End: 2019-09-23

## 2019-09-04 RX ADMIN — MEDROXYPROGESTERONE ACETATE 150 MG: 150 INJECTION, SUSPENSION INTRAMUSCULAR at 03:09

## 2019-09-04 NOTE — PROGRESS NOTES
Pregnancy test needed to be performed, arrived out of timeframe. Here for Depo Provera Injection,  UPT obtained with  NEGATIVE results . Patient with no current complaints of pain prior to or after injection. Advised to wait 5 minutes. Return between 11/20/2019 - 12/04/2019 for next injection.      PATIENT SUPPLIED MEDICATION.    See medication Card for RX information    Order verified, inspected package,storage verified,expiration verified    APPOINTMENT MADE FOR NEXT INJECTION    Site -  RB

## 2019-09-23 ENCOUNTER — OFFICE VISIT (OUTPATIENT)
Dept: OBSTETRICS AND GYNECOLOGY | Facility: CLINIC | Age: 49
End: 2019-09-23
Payer: COMMERCIAL

## 2019-09-23 VITALS
DIASTOLIC BLOOD PRESSURE: 82 MMHG | BODY MASS INDEX: 48.03 KG/M2 | HEIGHT: 62 IN | SYSTOLIC BLOOD PRESSURE: 144 MMHG | WEIGHT: 261 LBS

## 2019-09-23 DIAGNOSIS — Z01.419 ENCOUNTER FOR GYNECOLOGICAL EXAMINATION WITHOUT ABNORMAL FINDING: Primary | ICD-10-CM

## 2019-09-23 DIAGNOSIS — Z30.42 ON DEPO-PROVERA FOR CONTRACEPTION: ICD-10-CM

## 2019-09-23 DIAGNOSIS — N85.01 SIMPLE ENDOMETRIAL HYPERPLASIA: ICD-10-CM

## 2019-09-23 DIAGNOSIS — E66.01 MORBID OBESITY: ICD-10-CM

## 2019-09-23 PROCEDURE — 99999 PR PBB SHADOW E&M-EST. PATIENT-LVL III: CPT | Mod: PBBFAC,,, | Performed by: OBSTETRICS & GYNECOLOGY

## 2019-09-23 PROCEDURE — 88175 CYTOPATH C/V AUTO FLUID REDO: CPT

## 2019-09-23 PROCEDURE — 99396 PR PREVENTIVE VISIT,EST,40-64: ICD-10-PCS | Mod: S$GLB,,, | Performed by: OBSTETRICS & GYNECOLOGY

## 2019-09-23 PROCEDURE — 99999 PR PBB SHADOW E&M-EST. PATIENT-LVL III: ICD-10-PCS | Mod: PBBFAC,,, | Performed by: OBSTETRICS & GYNECOLOGY

## 2019-09-23 PROCEDURE — 99396 PREV VISIT EST AGE 40-64: CPT | Mod: S$GLB,,, | Performed by: OBSTETRICS & GYNECOLOGY

## 2019-09-23 RX ORDER — MEDROXYPROGESTERONE ACETATE 150 MG/ML
INJECTION, SUSPENSION INTRAMUSCULAR
Qty: 1 ML | Refills: 3 | Status: SHIPPED | OUTPATIENT
Start: 2019-09-23 | End: 2021-09-16

## 2019-09-23 RX ORDER — LOSARTAN POTASSIUM 100 MG/1
TABLET ORAL
COMMUNITY
Start: 2019-08-12

## 2019-09-23 RX ORDER — IBUPROFEN 800 MG/1
TABLET ORAL
COMMUNITY
Start: 2019-08-12 | End: 2021-09-16

## 2019-09-23 NOTE — PROGRESS NOTES
PT HERE FOR ANNUAL.  NO BLEEDING ON DEPO.    ROS:  GENERAL: No fever, chills, fatigability or weight loss.  VULVAR: No pain, no lesions and no itching.  VAGINAL: No relaxation, no itching, no discharge, no abnormal bleeding and no lesions.  ABDOMEN: No abdominal pain. Denies nausea. Denies vomiting. No diarrhea. No constipation  BREAST: Denies pain. No lumps. No discharge.  URINARY: No incontinence, no nocturia, no frequency and no dysuria.  CARDIOVASCULAR: No chest pain. No shortness of breath. No leg cramps.  NEUROLOGICAL: No headaches. No vision changes.  The remainder of the review of systems was negative.    PE:  General Appearance: obese And Well developed. Well nourished. In no acute distress.  Vulva: Lesions: No.  Urethral Meatus: Normal size. Normal location. No lesions. No prolapse.  Urethra: No masses. No tenderness. No prolapse. No scarring.  Bladder: No masses. No tenderness.  Vagina: Mucosa NI:yes discharge no, atrophy no, cystocele no or rectocele no.  Cervix: Lesion: no  Stenotic: no Cervical motion tenderness: no  Uterus: Uterus size: 9 weeks. Support good. Uterus size: Normal  Adnexa: Masses: No Tenderness: No CDS Nodularity: No  Abdomen: obese No masses. No tenderness.  Breasts: No bilateral masses. No bilateral discharge. No bilateral tenderness. No bilateral fibrocystic changes.  Neck: No thyroid enlargement. No thyroid masses.  Skin: Rashes: No      PROCEDURES:    PLAN:     DIAGNOSIS:  1. Encounter for gynecological examination without abnormal finding    2. On Depo-Provera for contraception    3. Simple endometrial hyperplasia    4. Morbid obesity        MEDICATIONS & ORDERS:  Orders Placed This Encounter    Liquid-based pap smear, screening       Patient was counseled today on the new ACS guidelines for cervical cytology screening as well as the current recommendations for breast cancer screening. She was counseled to follow up with her PCP for other routine health maintenance. Counseling  session lasted approximately 10 minutes, and all her questions were answered.         FOLLOW-UP: With me in 12 month

## 2019-12-04 ENCOUNTER — CLINICAL SUPPORT (OUTPATIENT)
Dept: OBSTETRICS AND GYNECOLOGY | Facility: CLINIC | Age: 49
End: 2019-12-04
Payer: COMMERCIAL

## 2019-12-04 DIAGNOSIS — Z30.42 ON DEPO-PROVERA FOR CONTRACEPTION: Primary | ICD-10-CM

## 2019-12-04 PROCEDURE — 96372 PR INJECTION,THERAP/PROPH/DIAG2ST, IM OR SUBCUT: ICD-10-PCS | Mod: S$GLB,,, | Performed by: OBSTETRICS & GYNECOLOGY

## 2019-12-04 PROCEDURE — 99999 PR PBB SHADOW E&M-EST. PATIENT-LVL I: CPT | Mod: PBBFAC,,,

## 2019-12-04 PROCEDURE — 99999 PR PBB SHADOW E&M-EST. PATIENT-LVL I: ICD-10-PCS | Mod: PBBFAC,,,

## 2019-12-04 PROCEDURE — 96372 THER/PROPH/DIAG INJ SC/IM: CPT | Mod: S$GLB,,, | Performed by: OBSTETRICS & GYNECOLOGY

## 2019-12-04 RX ORDER — MEDROXYPROGESTERONE ACETATE 150 MG/ML
150 INJECTION, SUSPENSION INTRAMUSCULAR
Status: COMPLETED | OUTPATIENT
Start: 2019-12-04 | End: 2020-08-12

## 2019-12-04 RX ADMIN — MEDROXYPROGESTERONE ACETATE 150 MG: 150 INJECTION, SUSPENSION INTRAMUSCULAR at 10:12

## 2019-12-04 NOTE — PROGRESS NOTES
Here for Depo Provera Injection, date due 11/20/2019 - 12/04/2019. Last injection given 09/04/2019 . Patient with no current complaints of pain prior to or after injection. Advised to wait 5 minutes. Return between 02/19/2020 - 03/05/2020 for next injection.      PATIENT SUPPLIED MEDICATION.    See medication Card for RX information    Order verified, inspected package,storage verified,expiration verified      Site -

## 2020-02-20 ENCOUNTER — TELEPHONE (OUTPATIENT)
Dept: OBSTETRICS AND GYNECOLOGY | Facility: CLINIC | Age: 50
End: 2020-02-20

## 2020-02-20 NOTE — TELEPHONE ENCOUNTER
----- Message from Acosta Stiles sent at 2/20/2020  1:51 PM CST -----  Contact: GAVIN HALEY [[id number]]  Name of Who is Calling: GAVIN HALEY [[id number]]      What is the request in detail: Would like to speak with staff in regards to scheduling injection for an evening appointment. Please advise      Can the clinic reply by [facility]: no      [facility] [phone]

## 2020-02-26 ENCOUNTER — CLINICAL SUPPORT (OUTPATIENT)
Dept: OBSTETRICS AND GYNECOLOGY | Facility: CLINIC | Age: 50
End: 2020-02-26
Payer: COMMERCIAL

## 2020-02-26 DIAGNOSIS — Z30.42 ON DEPO-PROVERA FOR CONTRACEPTION: Primary | ICD-10-CM

## 2020-02-26 PROCEDURE — 99999 PR PBB SHADOW E&M-EST. PATIENT-LVL I: ICD-10-PCS | Mod: PBBFAC,,,

## 2020-02-26 PROCEDURE — 96372 PR INJECTION,THERAP/PROPH/DIAG2ST, IM OR SUBCUT: ICD-10-PCS | Mod: S$GLB,,, | Performed by: OBSTETRICS & GYNECOLOGY

## 2020-02-26 PROCEDURE — 96372 THER/PROPH/DIAG INJ SC/IM: CPT | Mod: S$GLB,,, | Performed by: OBSTETRICS & GYNECOLOGY

## 2020-02-26 PROCEDURE — 99999 PR PBB SHADOW E&M-EST. PATIENT-LVL I: CPT | Mod: PBBFAC,,,

## 2020-02-26 RX ADMIN — MEDROXYPROGESTERONE ACETATE 150 MG: 150 INJECTION, SUSPENSION INTRAMUSCULAR at 04:02

## 2020-02-26 NOTE — PROGRESS NOTES
Here for Depo Provera Injection, date due 02/19/2020 - 03/05/2020. Last injection given 12/04/2019. Patient with no current complaints of pain prior to or after injection. Advised to wait 5 minutes. Return between 05/14/2020 - 05/28/2020 for next injection.      PATIENT SUPPLIED MEDICATION.    See medication Card for RX information    Order verified, inspected package,storage verified,expiration verified      Site -  RB

## 2020-05-20 ENCOUNTER — CLINICAL SUPPORT (OUTPATIENT)
Dept: OBSTETRICS AND GYNECOLOGY | Facility: CLINIC | Age: 50
End: 2020-05-20
Payer: COMMERCIAL

## 2020-05-20 DIAGNOSIS — Z30.42 ON DEPO-PROVERA FOR CONTRACEPTION: Primary | ICD-10-CM

## 2020-05-20 PROCEDURE — 99999 PR PBB SHADOW E&M-EST. PATIENT-LVL I: ICD-10-PCS | Mod: PBBFAC,,,

## 2020-05-20 PROCEDURE — 96372 PR INJECTION,THERAP/PROPH/DIAG2ST, IM OR SUBCUT: ICD-10-PCS | Mod: S$GLB,,, | Performed by: OBSTETRICS & GYNECOLOGY

## 2020-05-20 PROCEDURE — 99999 PR PBB SHADOW E&M-EST. PATIENT-LVL I: CPT | Mod: PBBFAC,,,

## 2020-05-20 PROCEDURE — 96372 THER/PROPH/DIAG INJ SC/IM: CPT | Mod: S$GLB,,, | Performed by: OBSTETRICS & GYNECOLOGY

## 2020-05-20 RX ADMIN — MEDROXYPROGESTERONE ACETATE 150 MG: 150 INJECTION, SUSPENSION INTRAMUSCULAR at 03:05

## 2020-05-20 NOTE — PROGRESS NOTES
Here for Depo Provera Injection, date due 05/14/2020 - 05/28/2020. Last injection given 02/26/2020. Patient with no current complaints of pain prior to or after injection. Advised to wait 5 minutes. Return between 08/05/2020 - 08/19/2020 for next injection.      PATIENT SUPPLIED MEDICATION.    See medication Card for RX information    Order verified, inspected package,storage verified,expiration verified      Site -

## 2020-08-12 ENCOUNTER — CLINICAL SUPPORT (OUTPATIENT)
Dept: OBSTETRICS AND GYNECOLOGY | Facility: CLINIC | Age: 50
End: 2020-08-12
Payer: COMMERCIAL

## 2020-08-12 DIAGNOSIS — Z30.42 ON DEPO-PROVERA FOR CONTRACEPTION: Primary | ICD-10-CM

## 2020-08-12 PROCEDURE — 96372 PR INJECTION,THERAP/PROPH/DIAG2ST, IM OR SUBCUT: ICD-10-PCS | Mod: S$GLB,,, | Performed by: OBSTETRICS & GYNECOLOGY

## 2020-08-12 PROCEDURE — 96372 THER/PROPH/DIAG INJ SC/IM: CPT | Mod: S$GLB,,, | Performed by: OBSTETRICS & GYNECOLOGY

## 2020-08-12 PROCEDURE — 99999 PR PBB SHADOW E&M-EST. PATIENT-LVL I: CPT | Mod: PBBFAC,,,

## 2020-08-12 PROCEDURE — 99999 PR PBB SHADOW E&M-EST. PATIENT-LVL I: ICD-10-PCS | Mod: PBBFAC,,,

## 2020-08-12 RX ADMIN — MEDROXYPROGESTERONE ACETATE 150 MG: 150 INJECTION, SUSPENSION INTRAMUSCULAR at 01:08

## 2020-08-12 NOTE — PROGRESS NOTES
Here for Depo Provera Injection, date due 08/05/2020 - 08/19/2020. Last injection given 05/20/2020. Patient with no current complaints of pain prior to or after injection. Advised to wait 5 minutes. Return between 10/28/2020 - 11/11/2020 for next injection.      PATIENT SUPPLIED MEDICATION.    See medication Card for RX information    Order verified, inspected package,storage verified,expiration verified      Site -

## 2020-08-27 ENCOUNTER — TELEPHONE (OUTPATIENT)
Dept: OBSTETRICS AND GYNECOLOGY | Facility: CLINIC | Age: 50
End: 2020-08-27

## 2020-08-27 DIAGNOSIS — Z12.31 VISIT FOR SCREENING MAMMOGRAM: Primary | ICD-10-CM

## 2020-08-27 NOTE — TELEPHONE ENCOUNTER
----- Message from Tg Kramer sent at 8/27/2020 12:41 PM CDT -----  Regarding: Patient Access  Name of Who is Calling: Malia Khanna      What is the request in detail:   Patient called requesting to schedule her annual mammogram on Thursday 09/10/2020; which is the same day of her annual with this office. Please put the orders in at your earliest convenience and further advise.      Reply by [facility]: no    Call Back:  [phone]

## 2020-09-10 ENCOUNTER — HOSPITAL ENCOUNTER (OUTPATIENT)
Dept: RADIOLOGY | Facility: OTHER | Age: 50
Discharge: HOME | End: 2020-09-10
Attending: OBSTETRICS & GYNECOLOGY
Payer: COMMERCIAL

## 2020-09-10 DIAGNOSIS — Z12.31 VISIT FOR SCREENING MAMMOGRAM: ICD-10-CM

## 2020-09-10 PROCEDURE — 77067 SCR MAMMO BI INCL CAD: CPT | Mod: TC

## 2020-09-10 PROCEDURE — 77063 MAMMO DIGITAL SCREENING BILAT WITH TOMO: ICD-10-PCS | Mod: 26,,, | Performed by: RADIOLOGY

## 2020-09-10 PROCEDURE — 77067 MAMMO DIGITAL SCREENING BILAT WITH TOMO: ICD-10-PCS | Mod: 26,,, | Performed by: RADIOLOGY

## 2020-09-10 PROCEDURE — 77063 BREAST TOMOSYNTHESIS BI: CPT | Mod: 26,,, | Performed by: RADIOLOGY

## 2020-09-10 PROCEDURE — 77067 SCR MAMMO BI INCL CAD: CPT | Mod: 26,,, | Performed by: RADIOLOGY

## 2020-11-09 ENCOUNTER — TELEPHONE (OUTPATIENT)
Dept: OBSTETRICS AND GYNECOLOGY | Facility: CLINIC | Age: 50
End: 2020-11-09

## 2020-11-09 RX ORDER — MEGESTROL ACETATE 40 MG/1
40 TABLET ORAL 4 TIMES DAILY
Qty: 120 TABLET | Refills: 3 | Status: SHIPPED | OUTPATIENT
Start: 2020-11-09 | End: 2021-09-16 | Stop reason: SDUPTHER

## 2020-11-09 NOTE — TELEPHONE ENCOUNTER
----- Message from Maribell Valentino sent at 11/9/2020 10:46 AM CST -----  Regarding: Callback  Name of Who is Calling: SUDHA GAVIN  What is the request in detail: pt is requesting a callback concerning her appt pt advised she have been on her cycle for 10 days now she said she have hyperplasia  Can the clinic reply by [facility]: NO  What [facility]: [phone] if you can reach hr on her cell pt advised to call her job [phone] ask for

## 2021-09-16 ENCOUNTER — OFFICE VISIT (OUTPATIENT)
Dept: OBSTETRICS AND GYNECOLOGY | Facility: CLINIC | Age: 51
End: 2021-09-16
Payer: COMMERCIAL

## 2021-09-16 VITALS — BODY MASS INDEX: 46.45 KG/M2 | WEIGHT: 252.44 LBS | HEIGHT: 62 IN

## 2021-09-16 DIAGNOSIS — N85.01 SIMPLE ENDOMETRIAL HYPERPLASIA: ICD-10-CM

## 2021-09-16 DIAGNOSIS — Z01.419 WELL WOMAN EXAM WITH ROUTINE GYNECOLOGICAL EXAM: Primary | ICD-10-CM

## 2021-09-16 DIAGNOSIS — Z12.31 VISIT FOR SCREENING MAMMOGRAM: ICD-10-CM

## 2021-09-16 PROCEDURE — 1160F RVW MEDS BY RX/DR IN RCRD: CPT | Mod: CPTII,S$GLB,, | Performed by: NURSE PRACTITIONER

## 2021-09-16 PROCEDURE — 99396 PR PREVENTIVE VISIT,EST,40-64: ICD-10-PCS | Mod: S$GLB,,, | Performed by: NURSE PRACTITIONER

## 2021-09-16 PROCEDURE — 1159F PR MEDICATION LIST DOCUMENTED IN MEDICAL RECORD: ICD-10-PCS | Mod: CPTII,S$GLB,, | Performed by: NURSE PRACTITIONER

## 2021-09-16 PROCEDURE — 3008F BODY MASS INDEX DOCD: CPT | Mod: CPTII,S$GLB,, | Performed by: NURSE PRACTITIONER

## 2021-09-16 PROCEDURE — 3008F PR BODY MASS INDEX (BMI) DOCUMENTED: ICD-10-PCS | Mod: CPTII,S$GLB,, | Performed by: NURSE PRACTITIONER

## 2021-09-16 PROCEDURE — 1159F MED LIST DOCD IN RCRD: CPT | Mod: CPTII,S$GLB,, | Performed by: NURSE PRACTITIONER

## 2021-09-16 PROCEDURE — 99999 PR PBB SHADOW E&M-EST. PATIENT-LVL III: ICD-10-PCS | Mod: PBBFAC,,, | Performed by: NURSE PRACTITIONER

## 2021-09-16 PROCEDURE — 1160F PR REVIEW ALL MEDS BY PRESCRIBER/CLIN PHARMACIST DOCUMENTED: ICD-10-PCS | Mod: CPTII,S$GLB,, | Performed by: NURSE PRACTITIONER

## 2021-09-16 PROCEDURE — 99999 PR PBB SHADOW E&M-EST. PATIENT-LVL III: CPT | Mod: PBBFAC,,, | Performed by: NURSE PRACTITIONER

## 2021-09-16 PROCEDURE — 99396 PREV VISIT EST AGE 40-64: CPT | Mod: S$GLB,,, | Performed by: NURSE PRACTITIONER

## 2021-09-16 RX ORDER — MEGESTROL ACETATE 40 MG/1
40 TABLET ORAL 4 TIMES DAILY
Qty: 120 TABLET | Refills: 3 | Status: SHIPPED | OUTPATIENT
Start: 2021-09-16 | End: 2022-11-18 | Stop reason: SDUPTHER

## 2021-09-29 ENCOUNTER — HOSPITAL ENCOUNTER (OUTPATIENT)
Dept: RADIOLOGY | Facility: OTHER | Age: 51
Discharge: HOME | End: 2021-09-29
Attending: NURSE PRACTITIONER
Payer: COMMERCIAL

## 2021-09-29 DIAGNOSIS — Z12.31 VISIT FOR SCREENING MAMMOGRAM: ICD-10-CM

## 2021-09-29 PROCEDURE — 77063 MAMMO DIGITAL SCREENING BILAT WITH TOMO: ICD-10-PCS | Mod: 26,,, | Performed by: RADIOLOGY

## 2021-09-29 PROCEDURE — 77067 MAMMO DIGITAL SCREENING BILAT WITH TOMO: ICD-10-PCS | Mod: 26,,, | Performed by: RADIOLOGY

## 2021-09-29 PROCEDURE — 77067 SCR MAMMO BI INCL CAD: CPT | Mod: 26,,, | Performed by: RADIOLOGY

## 2021-09-29 PROCEDURE — 77063 BREAST TOMOSYNTHESIS BI: CPT | Mod: 26,,, | Performed by: RADIOLOGY

## 2021-09-29 PROCEDURE — 77067 SCR MAMMO BI INCL CAD: CPT | Mod: TC

## 2021-10-01 ENCOUNTER — TELEPHONE (OUTPATIENT)
Dept: OBSTETRICS AND GYNECOLOGY | Facility: CLINIC | Age: 51
End: 2021-10-01

## 2022-11-08 ENCOUNTER — TELEPHONE (OUTPATIENT)
Dept: OBSTETRICS AND GYNECOLOGY | Facility: CLINIC | Age: 52
End: 2022-11-08
Payer: COMMERCIAL

## 2022-11-08 DIAGNOSIS — Z12.39 OTHER SCREENING BREAST EXAMINATION: Primary | ICD-10-CM

## 2022-11-08 NOTE — TELEPHONE ENCOUNTER
----- Message from Tana Nolan sent at 11/7/2022 11:02 AM CST -----  Name of Who is Calling:GAVIN HALEY [[id number]]              What is the request in detail:Requesting order for mmg              Can the clinic reply by [facility]:              What [facility][phone]

## 2022-11-10 ENCOUNTER — HOSPITAL ENCOUNTER (OUTPATIENT)
Dept: RADIOLOGY | Facility: HOSPITAL | Age: 52
Discharge: HOME | End: 2022-11-10
Attending: OBSTETRICS & GYNECOLOGY
Payer: COMMERCIAL

## 2022-11-10 VITALS — BODY MASS INDEX: 46.45 KG/M2 | HEIGHT: 62 IN | WEIGHT: 252.44 LBS

## 2022-11-10 DIAGNOSIS — Z12.39 OTHER SCREENING BREAST EXAMINATION: ICD-10-CM

## 2022-11-10 PROCEDURE — 77063 MAMMO DIGITAL SCREENING BILAT WITH TOMO: ICD-10-PCS | Mod: 26,,, | Performed by: RADIOLOGY

## 2022-11-10 PROCEDURE — 77063 BREAST TOMOSYNTHESIS BI: CPT | Mod: 26,,, | Performed by: RADIOLOGY

## 2022-11-10 PROCEDURE — 77067 SCR MAMMO BI INCL CAD: CPT | Mod: 26,,, | Performed by: RADIOLOGY

## 2022-11-10 PROCEDURE — 77067 MAMMO DIGITAL SCREENING BILAT WITH TOMO: ICD-10-PCS | Mod: 26,,, | Performed by: RADIOLOGY

## 2022-11-10 PROCEDURE — 77063 BREAST TOMOSYNTHESIS BI: CPT | Mod: TC

## 2022-11-11 ENCOUNTER — LAB VISIT (OUTPATIENT)
Dept: LAB | Facility: OTHER | Age: 52
End: 2022-11-11
Attending: OBSTETRICS & GYNECOLOGY
Payer: COMMERCIAL

## 2022-11-11 ENCOUNTER — OFFICE VISIT (OUTPATIENT)
Dept: OBSTETRICS AND GYNECOLOGY | Facility: CLINIC | Age: 52
End: 2022-11-11
Payer: COMMERCIAL

## 2022-11-11 VITALS
SYSTOLIC BLOOD PRESSURE: 147 MMHG | HEIGHT: 62 IN | DIASTOLIC BLOOD PRESSURE: 113 MMHG | BODY MASS INDEX: 47.06 KG/M2 | WEIGHT: 255.75 LBS

## 2022-11-11 DIAGNOSIS — Z01.419 WELL WOMAN EXAM WITH ROUTINE GYNECOLOGICAL EXAM: Primary | ICD-10-CM

## 2022-11-11 DIAGNOSIS — N85.01 SIMPLE ENDOMETRIAL HYPERPLASIA: ICD-10-CM

## 2022-11-11 PROCEDURE — 3008F PR BODY MASS INDEX (BMI) DOCUMENTED: ICD-10-PCS | Mod: CPTII,S$GLB,, | Performed by: OBSTETRICS & GYNECOLOGY

## 2022-11-11 PROCEDURE — 99999 PR PBB SHADOW E&M-EST. PATIENT-LVL III: CPT | Mod: PBBFAC,,, | Performed by: OBSTETRICS & GYNECOLOGY

## 2022-11-11 PROCEDURE — 3008F BODY MASS INDEX DOCD: CPT | Mod: CPTII,S$GLB,, | Performed by: OBSTETRICS & GYNECOLOGY

## 2022-11-11 PROCEDURE — 3080F DIAST BP >= 90 MM HG: CPT | Mod: CPTII,S$GLB,, | Performed by: OBSTETRICS & GYNECOLOGY

## 2022-11-11 PROCEDURE — 1159F MED LIST DOCD IN RCRD: CPT | Mod: CPTII,S$GLB,, | Performed by: OBSTETRICS & GYNECOLOGY

## 2022-11-11 PROCEDURE — 99396 PREV VISIT EST AGE 40-64: CPT | Mod: S$GLB,,, | Performed by: OBSTETRICS & GYNECOLOGY

## 2022-11-11 PROCEDURE — 1159F PR MEDICATION LIST DOCUMENTED IN MEDICAL RECORD: ICD-10-PCS | Mod: CPTII,S$GLB,, | Performed by: OBSTETRICS & GYNECOLOGY

## 2022-11-11 PROCEDURE — 82670 ASSAY OF TOTAL ESTRADIOL: CPT | Performed by: OBSTETRICS & GYNECOLOGY

## 2022-11-11 PROCEDURE — 99396 PR PREVENTIVE VISIT,EST,40-64: ICD-10-PCS | Mod: S$GLB,,, | Performed by: OBSTETRICS & GYNECOLOGY

## 2022-11-11 PROCEDURE — 3077F PR MOST RECENT SYSTOLIC BLOOD PRESSURE >= 140 MM HG: ICD-10-PCS | Mod: CPTII,S$GLB,, | Performed by: OBSTETRICS & GYNECOLOGY

## 2022-11-11 PROCEDURE — 36415 COLL VENOUS BLD VENIPUNCTURE: CPT | Performed by: OBSTETRICS & GYNECOLOGY

## 2022-11-11 PROCEDURE — 3080F PR MOST RECENT DIASTOLIC BLOOD PRESSURE >= 90 MM HG: ICD-10-PCS | Mod: CPTII,S$GLB,, | Performed by: OBSTETRICS & GYNECOLOGY

## 2022-11-11 PROCEDURE — 83001 ASSAY OF GONADOTROPIN (FSH): CPT | Performed by: OBSTETRICS & GYNECOLOGY

## 2022-11-11 PROCEDURE — 88175 CYTOPATH C/V AUTO FLUID REDO: CPT | Performed by: OBSTETRICS & GYNECOLOGY

## 2022-11-11 PROCEDURE — 3077F SYST BP >= 140 MM HG: CPT | Mod: CPTII,S$GLB,, | Performed by: OBSTETRICS & GYNECOLOGY

## 2022-11-11 PROCEDURE — 99999 PR PBB SHADOW E&M-EST. PATIENT-LVL III: ICD-10-PCS | Mod: PBBFAC,,, | Performed by: OBSTETRICS & GYNECOLOGY

## 2022-11-11 NOTE — PROGRESS NOTES
11/11/22    Estradiol 14   FSH 13.02       PT HERE FOR ANNUAL.  NO BLEEDING. ON MEGACE QD.    ROS:  GENERAL: No fever, chills, fatigability or weight loss.  VULVAR: No pain, no lesions and no itching.  VAGINAL: No relaxation, no itching, no discharge, no abnormal bleeding and no lesions.  ABDOMEN: No abdominal pain. Denies nausea. Denies vomiting. No diarrhea. No constipation  BREAST: Denies pain. No lumps. No discharge.  URINARY: No incontinence, no nocturia, no frequency and no dysuria.  CARDIOVASCULAR: No chest pain. No shortness of breath. No leg cramps.  NEUROLOGICAL: No headaches. No vision changes.  The remainder of the review of systems was negative.    PE:  General Appearance: obese And Well developed. Well nourished. In no acute distress.  Vulva: Lesions: No.  Urethral Meatus: Normal size. Normal location. No lesions. No prolapse.  Urethra: No masses. No tenderness. No prolapse. No scarring.  Bladder: No masses. No tenderness.  Vagina: Mucosa NI:yes discharge no, atrophy no, cystocele no or rectocele no.  Cervix: Lesion: no  Stenotic: no Cervical motion tenderness: no  Uterus: Uterus size: 7 weeks. Support good. Uterus size: Normal  Adnexa: Masses: No Tenderness: No CDS Nodularity: No  Abdomen: obese No masses. No tenderness.  Breasts: No bilateral masses. No bilateral discharge. No bilateral tenderness. No bilateral fibrocystic changes.  Neck: No thyroid enlargement. No thyroid masses.  Skin: Rashes: No      PROCEDURES:    PLAN:     DIAGNOSIS:  1. Well woman exam with routine gynecological exam    2. Simple endometrial hyperplasia        MEDICATIONS & ORDERS:  Orders Placed This Encounter    Follicle Stimulating Hormone    Estradiol    Pap Smear, Thin Prep with Reflex to HPV       Patient was counseled today on the new ACS guidelines for cervical cytology screening as well as the current recommendations for breast cancer screening. She was counseled to follow up with her PCP for other routine health  maintenance. Counseling session lasted approximately 10 minutes, and all her questions were answered.         FOLLOW-UP: With me in 12 month    Tonio Ball Jr, MD, FACOG

## 2022-11-12 LAB
ESTRADIOL SERPL-MCNC: 14 PG/ML
FSH SERPL-ACNC: 13.02 MIU/ML

## 2023-12-06 DIAGNOSIS — Z12.31 ENCOUNTER FOR SCREENING MAMMOGRAM FOR MALIGNANT NEOPLASM OF BREAST: Primary | ICD-10-CM

## 2023-12-20 ENCOUNTER — HOSPITAL ENCOUNTER (OUTPATIENT)
Dept: RADIOLOGY | Facility: OTHER | Age: 53
Discharge: HOME | End: 2023-12-20
Attending: NURSE PRACTITIONER
Payer: MEDICAID

## 2023-12-20 DIAGNOSIS — Z12.31 ENCOUNTER FOR SCREENING MAMMOGRAM FOR MALIGNANT NEOPLASM OF BREAST: ICD-10-CM

## 2023-12-20 PROCEDURE — 77063 MAMMO DIGITAL SCREENING BILAT WITH TOMO: ICD-10-PCS | Mod: 26,,, | Performed by: RADIOLOGY

## 2023-12-20 PROCEDURE — 77067 MAMMO DIGITAL SCREENING BILAT WITH TOMO: ICD-10-PCS | Mod: 26,,, | Performed by: RADIOLOGY

## 2023-12-20 PROCEDURE — 77063 BREAST TOMOSYNTHESIS BI: CPT | Mod: 26,,, | Performed by: RADIOLOGY

## 2023-12-20 PROCEDURE — 77067 SCR MAMMO BI INCL CAD: CPT | Mod: 26,,, | Performed by: RADIOLOGY

## 2023-12-20 PROCEDURE — 77067 SCR MAMMO BI INCL CAD: CPT | Mod: TC

## 2025-01-16 DIAGNOSIS — Z12.31 ENCOUNTER FOR SCREENING MAMMOGRAM FOR MALIGNANT NEOPLASM OF BREAST: Primary | ICD-10-CM

## 2025-01-28 ENCOUNTER — HOSPITAL ENCOUNTER (OUTPATIENT)
Dept: RADIOLOGY | Facility: OTHER | Age: 55
Discharge: HOME | End: 2025-01-28
Attending: NURSE PRACTITIONER
Payer: MEDICAID

## 2025-01-28 DIAGNOSIS — Z12.31 ENCOUNTER FOR SCREENING MAMMOGRAM FOR MALIGNANT NEOPLASM OF BREAST: ICD-10-CM

## 2025-01-28 PROCEDURE — 77067 SCR MAMMO BI INCL CAD: CPT | Mod: 26,,, | Performed by: RADIOLOGY

## 2025-01-28 PROCEDURE — 77063 BREAST TOMOSYNTHESIS BI: CPT | Mod: 26,,, | Performed by: RADIOLOGY

## 2025-01-28 PROCEDURE — 77063 BREAST TOMOSYNTHESIS BI: CPT | Mod: TC
